# Patient Record
Sex: FEMALE | Race: OTHER | NOT HISPANIC OR LATINO | URBAN - METROPOLITAN AREA
[De-identification: names, ages, dates, MRNs, and addresses within clinical notes are randomized per-mention and may not be internally consistent; named-entity substitution may affect disease eponyms.]

---

## 2020-10-29 PROBLEM — Z00.00 ENCOUNTER FOR PREVENTIVE HEALTH EXAMINATION: Status: ACTIVE | Noted: 2020-10-29

## 2020-11-14 ENCOUNTER — OUTPATIENT (OUTPATIENT)
Dept: OUTPATIENT SERVICES | Facility: HOSPITAL | Age: 60
LOS: 1 days | End: 2020-11-14
Payer: COMMERCIAL

## 2020-11-14 ENCOUNTER — APPOINTMENT (OUTPATIENT)
Dept: ULTRASOUND IMAGING | Facility: IMAGING CENTER | Age: 60
End: 2020-11-14
Payer: COMMERCIAL

## 2020-11-14 DIAGNOSIS — K29.70 GASTRITIS, UNSPECIFIED, WITHOUT BLEEDING: ICD-10-CM

## 2020-11-14 PROCEDURE — 76700 US EXAM ABDOM COMPLETE: CPT | Mod: 26

## 2020-11-14 PROCEDURE — 76700 US EXAM ABDOM COMPLETE: CPT

## 2022-04-29 ENCOUNTER — EMERGENCY (EMERGENCY)
Facility: HOSPITAL | Age: 62
LOS: 1 days | Discharge: HOME | End: 2022-04-29
Attending: EMERGENCY MEDICINE | Admitting: EMERGENCY MEDICINE
Payer: COMMERCIAL

## 2022-04-29 VITALS
DIASTOLIC BLOOD PRESSURE: 86 MMHG | RESPIRATION RATE: 16 BRPM | OXYGEN SATURATION: 100 % | TEMPERATURE: 98 F | SYSTOLIC BLOOD PRESSURE: 151 MMHG | HEART RATE: 73 BPM

## 2022-04-29 PROCEDURE — 99285 EMERGENCY DEPT VISIT HI MDM: CPT

## 2022-04-29 NOTE — ED ADULT TRIAGE NOTE - CHIEF COMPLAINT QUOTE
Pt c/o mid and lower back pain radiating to ribs since friday. pt denies any urinary symptoms. pt  has an sonogram done and they didn't find any stones. No complaints of chest pain, headache, nausea, dizziness, vomiting  SOB, fever, chills verbalized.

## 2022-04-30 VITALS
SYSTOLIC BLOOD PRESSURE: 141 MMHG | RESPIRATION RATE: 18 BRPM | DIASTOLIC BLOOD PRESSURE: 78 MMHG | TEMPERATURE: 98 F | HEART RATE: 68 BPM | OXYGEN SATURATION: 100 %

## 2022-04-30 LAB
ALBUMIN SERPL ELPH-MCNC: 4.6 G/DL — SIGNIFICANT CHANGE UP (ref 3.3–5)
ALP SERPL-CCNC: 105 U/L — SIGNIFICANT CHANGE UP (ref 40–120)
ALT FLD-CCNC: 31 U/L — SIGNIFICANT CHANGE UP (ref 4–33)
ANION GAP SERPL CALC-SCNC: 15 MMOL/L — HIGH (ref 7–14)
ANISOCYTOSIS BLD QL: SLIGHT — SIGNIFICANT CHANGE UP
APPEARANCE UR: CLEAR — SIGNIFICANT CHANGE UP
AST SERPL-CCNC: 27 U/L — SIGNIFICANT CHANGE UP (ref 4–32)
BACTERIA # UR AUTO: NEGATIVE — SIGNIFICANT CHANGE UP
BASE EXCESS BLDV CALC-SCNC: 1.6 MMOL/L — SIGNIFICANT CHANGE UP (ref -2–3)
BASOPHILS # BLD AUTO: 0 K/UL — SIGNIFICANT CHANGE UP (ref 0–0.2)
BASOPHILS NFR BLD AUTO: 0 % — SIGNIFICANT CHANGE UP (ref 0–2)
BILIRUB SERPL-MCNC: 0.4 MG/DL — SIGNIFICANT CHANGE UP (ref 0.2–1.2)
BILIRUB UR-MCNC: NEGATIVE — SIGNIFICANT CHANGE UP
BLOOD GAS VENOUS COMPREHENSIVE RESULT: SIGNIFICANT CHANGE UP
BUN SERPL-MCNC: 21 MG/DL — SIGNIFICANT CHANGE UP (ref 7–23)
CALCIUM SERPL-MCNC: 10 MG/DL — SIGNIFICANT CHANGE UP (ref 8.4–10.5)
CHLORIDE BLDV-SCNC: 104 MMOL/L — SIGNIFICANT CHANGE UP (ref 96–108)
CHLORIDE SERPL-SCNC: 100 MMOL/L — SIGNIFICANT CHANGE UP (ref 98–107)
CO2 BLDV-SCNC: 30.2 MMOL/L — HIGH (ref 22–26)
CO2 SERPL-SCNC: 23 MMOL/L — SIGNIFICANT CHANGE UP (ref 22–31)
COLOR SPEC: COLORLESS — SIGNIFICANT CHANGE UP
CREAT SERPL-MCNC: 1.14 MG/DL — SIGNIFICANT CHANGE UP (ref 0.5–1.3)
DIFF PNL FLD: ABNORMAL
EGFR: 54 ML/MIN/1.73M2 — LOW
EOSINOPHIL # BLD AUTO: 0 K/UL — SIGNIFICANT CHANGE UP (ref 0–0.5)
EOSINOPHIL NFR BLD AUTO: 0 % — SIGNIFICANT CHANGE UP (ref 0–6)
EPI CELLS # UR: 0 /HPF — SIGNIFICANT CHANGE UP (ref 0–5)
GAS PNL BLDV: 138 MMOL/L — SIGNIFICANT CHANGE UP (ref 136–145)
GIANT PLATELETS BLD QL SMEAR: PRESENT — SIGNIFICANT CHANGE UP
GLUCOSE BLDV-MCNC: 167 MG/DL — HIGH (ref 70–99)
GLUCOSE SERPL-MCNC: 158 MG/DL — HIGH (ref 70–99)
GLUCOSE UR QL: NEGATIVE — SIGNIFICANT CHANGE UP
HCO3 BLDV-SCNC: 28 MMOL/L — SIGNIFICANT CHANGE UP (ref 22–29)
HCT VFR BLD CALC: 38.1 % — SIGNIFICANT CHANGE UP (ref 34.5–45)
HCT VFR BLDA CALC: 38 % — SIGNIFICANT CHANGE UP (ref 34.5–46.5)
HGB BLD CALC-MCNC: 12.5 G/DL — SIGNIFICANT CHANGE UP (ref 11.5–15.5)
HGB BLD-MCNC: 12.3 G/DL — SIGNIFICANT CHANGE UP (ref 11.5–15.5)
IANC: 10.95 K/UL — HIGH (ref 1.8–7.4)
KETONES UR-MCNC: NEGATIVE — SIGNIFICANT CHANGE UP
LACTATE BLDV-MCNC: 2.4 MMOL/L — HIGH (ref 0.5–2)
LEUKOCYTE ESTERASE UR-ACNC: NEGATIVE — SIGNIFICANT CHANGE UP
LIDOCAIN IGE QN: 33 U/L — SIGNIFICANT CHANGE UP (ref 7–60)
LYMPHOCYTES # BLD AUTO: 1.68 K/UL — SIGNIFICANT CHANGE UP (ref 1–3.3)
LYMPHOCYTES # BLD AUTO: 11.4 % — LOW (ref 13–44)
MCHC RBC-ENTMCNC: 24 PG — LOW (ref 27–34)
MCHC RBC-ENTMCNC: 32.3 GM/DL — SIGNIFICANT CHANGE UP (ref 32–36)
MCV RBC AUTO: 74.3 FL — LOW (ref 80–100)
MICROCYTES BLD QL: SLIGHT — SIGNIFICANT CHANGE UP
MONOCYTES # BLD AUTO: 0.38 K/UL — SIGNIFICANT CHANGE UP (ref 0–0.9)
MONOCYTES NFR BLD AUTO: 2.6 % — SIGNIFICANT CHANGE UP (ref 2–14)
NEUTROPHILS # BLD AUTO: 12.29 K/UL — HIGH (ref 1.8–7.4)
NEUTROPHILS NFR BLD AUTO: 83.4 % — HIGH (ref 43–77)
NITRITE UR-MCNC: NEGATIVE — SIGNIFICANT CHANGE UP
PCO2 BLDV: 54 MMHG — HIGH (ref 39–42)
PH BLDV: 7.33 — SIGNIFICANT CHANGE UP (ref 7.32–7.43)
PH UR: 7 — SIGNIFICANT CHANGE UP (ref 5–8)
PLAT MORPH BLD: NORMAL — SIGNIFICANT CHANGE UP
PLATELET # BLD AUTO: 254 K/UL — SIGNIFICANT CHANGE UP (ref 150–400)
PLATELET COUNT - ESTIMATE: NORMAL — SIGNIFICANT CHANGE UP
PO2 BLDV: 31 MMHG — SIGNIFICANT CHANGE UP
POTASSIUM BLDV-SCNC: 4 MMOL/L — SIGNIFICANT CHANGE UP (ref 3.5–5.1)
POTASSIUM SERPL-MCNC: 4.2 MMOL/L — SIGNIFICANT CHANGE UP (ref 3.5–5.3)
POTASSIUM SERPL-SCNC: 4.2 MMOL/L — SIGNIFICANT CHANGE UP (ref 3.5–5.3)
PROT SERPL-MCNC: 8.1 G/DL — SIGNIFICANT CHANGE UP (ref 6–8.3)
PROT UR-MCNC: NEGATIVE — SIGNIFICANT CHANGE UP
RBC # BLD: 5.13 M/UL — SIGNIFICANT CHANGE UP (ref 3.8–5.2)
RBC # FLD: 13.5 % — SIGNIFICANT CHANGE UP (ref 10.3–14.5)
RBC BLD AUTO: ABNORMAL
RBC CASTS # UR COMP ASSIST: 23 /HPF — HIGH (ref 0–4)
SAO2 % BLDV: 45.1 % — SIGNIFICANT CHANGE UP
SARS-COV-2 RNA SPEC QL NAA+PROBE: SIGNIFICANT CHANGE UP
SODIUM SERPL-SCNC: 138 MMOL/L — SIGNIFICANT CHANGE UP (ref 135–145)
SP GR SPEC: 1.01 — SIGNIFICANT CHANGE UP (ref 1–1.05)
TROPONIN T, HIGH SENSITIVITY RESULT: 6 NG/L — SIGNIFICANT CHANGE UP
UROBILINOGEN FLD QL: SIGNIFICANT CHANGE UP
VARIANT LYMPHS # BLD: 2.6 % — SIGNIFICANT CHANGE UP (ref 0–6)
WBC # BLD: 14.74 K/UL — HIGH (ref 3.8–10.5)
WBC # FLD AUTO: 14.74 K/UL — HIGH (ref 3.8–10.5)
WBC UR QL: 0 /HPF — SIGNIFICANT CHANGE UP (ref 0–5)

## 2022-04-30 PROCEDURE — 72131 CT LUMBAR SPINE W/O DYE: CPT | Mod: 26,MA

## 2022-04-30 PROCEDURE — G1004: CPT

## 2022-04-30 PROCEDURE — 72128 CT CHEST SPINE W/O DYE: CPT | Mod: 26,MA

## 2022-04-30 PROCEDURE — 74176 CT ABD & PELVIS W/O CONTRAST: CPT | Mod: 26,MG

## 2022-04-30 PROCEDURE — 71045 X-RAY EXAM CHEST 1 VIEW: CPT | Mod: 26

## 2022-04-30 RX ORDER — ACETAMINOPHEN 500 MG
975 TABLET ORAL ONCE
Refills: 0 | Status: COMPLETED | OUTPATIENT
Start: 2022-04-30 | End: 2022-04-30

## 2022-04-30 RX ORDER — SODIUM CHLORIDE 9 MG/ML
1000 INJECTION INTRAMUSCULAR; INTRAVENOUS; SUBCUTANEOUS ONCE
Refills: 0 | Status: COMPLETED | OUTPATIENT
Start: 2022-04-30 | End: 2022-04-30

## 2022-04-30 RX ADMIN — SODIUM CHLORIDE 1000 MILLILITER(S): 9 INJECTION INTRAMUSCULAR; INTRAVENOUS; SUBCUTANEOUS at 01:23

## 2022-04-30 RX ADMIN — Medication 975 MILLIGRAM(S): at 01:23

## 2022-04-30 NOTE — ED ADULT NURSE NOTE - OBJECTIVE STATEMENT
Received in room 17 with c/o right side lower back pain radiating to ribs for the last 2 days. Denies any urinary symptoms. Also c/o nausea. not in acute distress. Alert and oriented x 4, safety maintained. 20G IV line inserted in right AC. Due labs sent. Medication given as per order.

## 2022-04-30 NOTE — ED PROVIDER NOTE - PROGRESS NOTE DETAILS
Johnnie GOVEA: Pt is stable and ready for discharge. Strict return precautions given. All questions answered.

## 2022-04-30 NOTE — ED PROVIDER NOTE - PHYSICAL EXAMINATION
GENERAL: well appearing in no acute distress, non-toxic appearing  HEAD: normocephalic, atraumatic  HEENT: normal conjunctiva, oral mucosa moist, uvula midline, no tonsilar exudates, neck supple, no JVD  CARDIAC: regular rate and rhythm, normal S1S2, no appreciable murmurs, 2+ pulses in UE  PULM: normal breath sounds, clear to ascultation bilaterally, no rales, rhonchi, wheezing  GI: abdomen nondistended, soft, nontender, no guarding, rebound tenderness  : no CVA tenderness b/l, no suprapubic tenderness  NEURO: no focal motor or sensory deficits, CN2-12 intact, normal speech, PERRLA, EOMI, normal gait, AAOx3  MSK: no peripheral edema, no calf tenderness b/l  SKIN: extremities warm, no visible rashes  PSYCH: appropriate mood and affect

## 2022-04-30 NOTE — ED PROVIDER NOTE - NS ED ROS FT
CONSTITUTIONAL: No fever, weight loss, or fatigue  EYES: No eye pain, visual disturbances, or discharge  ENMT:  No difficulty hearing, tinnitus, vertigo; No sinus or throat pain  NECK: No pain or stiffness  RESPIRATORY: No cough, wheezing, chills or hemoptysis; No shortness of breath  CARDIOVASCULAR: No chest pain, palpitations, dizziness, or leg swelling  GASTROINTESTINAL: + abdominal or epigastric pain. +nausea, no vomiting, or hematemesis; No diarrhea or constipation. No melena or hematochezia.  GENITOURINARY: No dysuria, frequency, +hematuria, no incontinence  NEUROLOGICAL: No headaches, memory loss, loss of strength, numbness, or tremors  SKIN: No itching, burning, rashes, or lesions   MSK:+ Back pain

## 2022-04-30 NOTE — ED PROVIDER NOTE - CLINICAL SUMMARY MEDICAL DECISION MAKING FREE TEXT BOX
60 y/o female w/ PMH DM, HTN, breast cancer s/p left breast lumpectomy c/o 1 week history of intermittent moderate localized mid-low back pain, relieved w/ Aleve. Pt had hematuria at PCP's office, initially thought of kidney stones but unremarkable abd/pelvis sonogram. PCP sent for CT scan. Pt is now having right flank pain w/ radiation to the right abdomen in addition to her mid-low back pain. Pt took 600mg ibuprofen at 7pm prior to ED arrival. Admits to nausea. Likely nephrolithiasis in the setting of known hematuria. Benign abdominal exam w/o CVA tenderness and no midline or paraspinal tenderness in the back. Will obtain CT imaging of back, abd, and pelvis to screen for tumor (hx of breast cancer), kidney stones, and reassess w/ likely d/c w/ close uro f/u.

## 2022-04-30 NOTE — ED PROVIDER NOTE - ATTENDING CONTRIBUTION TO CARE
60yo F with PMHx DM, HTN, renal stones without prior interventions required, L breast ca s/p lumpectomy now in remission, p/w atraumatic R flank/back pain with radiation to abdomen with nausea but no vomiting, no dysuria or hematuria. Pt notes she saw her PCP and had a UA that showed microscopic hematuria and had a renal ultrasound with no abnormalities. Pt comes ot ED for CT scan.  No fevers    General: Patient alert in no apparent distress  Skin: Dry and intact  HEENT: Head atraumatic. Oral mucosa moist.   Eyes: Conjunctiva normal  Cardiac: Regular rhythm and rate. No pretibial edema b/l  Respiratory: Lungs clear b/l and symmetric. No respiratory distress. Able to speak in complete sentences.  Gastrointestinal: Abdomen soft, nondistended, nontender  Musculoskeletal: Moves all extremities spontaneously  Neurological: alert and oriented to person, place, and time  Psychiatric: Calm and cooperative    a/p  likely ureterolithiasis vs malignancy or uti  tylenol, ua, labs, CT noncon abd/pelvis, screening CT T/L spine for fx given age

## 2022-04-30 NOTE — ED PROVIDER NOTE - NSFOLLOWUPINSTRUCTIONS_ED_ALL_ED_FT
Kidney Stones    Kidney stones (urolithiasis) are crystal deposits that form inside your kidneys. Pain is caused by the stone moving through the urinary tract, causing spasms of the ureter. Drink enough water and fluids to keep your urine clear or pale yellow. This will help you to pass the stone or stone fragments. If provided a strainer, strain all urine and keep all particulate matter and stones for a follow up appointment with a urologist.    SEEK IMMEDIATE MEDICAL CARE IF YOU HAVE ANY OF THE FOLLOWING SYMPTOMS: pain not controlled with medication, fever/chills, worsening vomiting, inability to urinate, or dizziness/lightheadedness.    Back Pain    Back pain is very common in adults. The cause of back pain is rarely dangerous and the pain often gets better over time. The cause of your back pain may not be known and may include strain of muscles or ligaments, degeneration of the spinal disks, or arthritis. Occasionally the pain may radiate down your leg(s). Over-the-counter medicines to reduce pain and inflammation are often the most helpful. Stretching and remaining active frequently helps the healing process.     SEEK IMMEDIATE MEDICAL CARE IF YOU HAVE ANY OF THE FOLLOWING SYMPTOMS: bowel or bladder control problems, unusual weakness or numbness in your arms or legs, nausea or vomiting, abdominal pain, fever, dizziness/lightheadedness. Please follow up with your urology doctor within this week.     Kidney Stones    Kidney stones (urolithiasis) are crystal deposits that form inside your kidneys. Pain is caused by the stone moving through the urinary tract, causing spasms of the ureter. Drink enough water and fluids to keep your urine clear or pale yellow. This will help you to pass the stone or stone fragments. If provided a strainer, strain all urine and keep all particulate matter and stones for a follow up appointment with a urologist.    SEEK IMMEDIATE MEDICAL CARE IF YOU HAVE ANY OF THE FOLLOWING SYMPTOMS: pain not controlled with medication, fever/chills, worsening vomiting, inability to urinate, or dizziness/lightheadedness.    Back Pain    Back pain is very common in adults. The cause of back pain is rarely dangerous and the pain often gets better over time. The cause of your back pain may not be known and may include strain of muscles or ligaments, degeneration of the spinal disks, or arthritis. Occasionally the pain may radiate down your leg(s). Over-the-counter medicines to reduce pain and inflammation are often the most helpful. Stretching and remaining active frequently helps the healing process.     SEEK IMMEDIATE MEDICAL CARE IF YOU HAVE ANY OF THE FOLLOWING SYMPTOMS: bowel or bladder control problems, unusual weakness or numbness in your arms or legs, nausea or vomiting, abdominal pain, fever, dizziness/lightheadedness.

## 2022-04-30 NOTE — ED PROVIDER NOTE - NSFOLLOWUPCLINICS_GEN_ALL_ED_FT
[facility] - Urology  Urology  [address], [address] & [address] [place] [address]  Phone: [phone]  Fax:

## 2022-04-30 NOTE — ED PROVIDER NOTE - OBJECTIVE STATEMENT
62 y/o female w/ PMH DM, HTN, breast cancer s/p left breast lumpectomy c/o 1 week history of intermittent moderate localized mid-low back pain, relieved w/ Aleve. Pt had hematuria at PCP's office, initially thought of kidney stones but unremarkable abd/pelvis sonogram. PCP sent for CT scan. Pt is now having right flank pain w/ radiation to the right abdomen in addition to her mid-low back pain. Pt took 600mg ibuprofen at 7pm prior to ED arrival. Admits to nausea. Denies fevers, chills, vomiting, dizziness, chest pain, SOB, dysuria, diarrhea, constipation.

## 2022-04-30 NOTE — ED PROVIDER NOTE - PATIENT PORTAL LINK FT
You can access the FollowMyHealth Patient Portal offered by [facility] by registering at the following website: [email or web address]. By joining [facility]’s FollowMyHealth portal, you will also be able to view your health information using other applications (apps) compatible with our system.

## 2022-05-01 LAB
CULTURE RESULTS: SIGNIFICANT CHANGE UP
SPECIMEN SOURCE: SIGNIFICANT CHANGE UP

## 2022-05-02 ENCOUNTER — APPOINTMENT (OUTPATIENT)
Dept: UROLOGY | Facility: CLINIC | Age: 62
End: 2022-05-02
Payer: COMMERCIAL

## 2022-05-02 VITALS
HEIGHT: 62 IN | TEMPERATURE: 97.2 F | DIASTOLIC BLOOD PRESSURE: 84 MMHG | HEART RATE: 65 BPM | SYSTOLIC BLOOD PRESSURE: 146 MMHG | OXYGEN SATURATION: 98 % | WEIGHT: 116 LBS | BODY MASS INDEX: 21.35 KG/M2 | RESPIRATION RATE: 12 BRPM

## 2022-05-02 PROCEDURE — 99204 OFFICE O/P NEW MOD 45 MIN: CPT

## 2022-05-02 NOTE — ASSESSMENT
[FreeTextEntry1] : Very pleasant 61-year-old woman who presents for evaluation of right ureteral stone, right flank pain\par -CT images reviewed with patient demonstrating approximately 5 to 6 mm right distal ureteral stone\par -We discussed options for management of a distal ureteral stone, including observation with trial of a spontaneous stone passage, ESWL, PCNL, ureteroscopy with laser lithotripsy and after thorough discussion of the risks benefits and alternatives of the procedure she elected to undergo a ureteroscopy\par -I discussed the different treatment modalities for nephrolithiasis with the patient, including medical management, spontaneous stone passage, percutaneous stone extraction, extracorporeal shock wave lithotripsy, and ureteroscopy with laser lithotripsy and stone extraction. Given the size and location of the stone, the patient opted to proceed with ureteroscopy.  The risks, benefits, and alternatives to ureteroscopy were discussed with the patient, including but not limited to pain, infection, bleeding, bladder injury, ureteral injury, renal injury, and treatment failure.  I also discussed the possible need for a temporary ureteral stent.  I discussed the possible side effects of a temporary ureteral stent, including flank pain, hematuria, and bladder spasms.  The patient understands that a stent is a temporary implant that must be removed in the future.  The patient wishes to proceed and we will schedule the surgery for the near future.\par -Urine culture

## 2022-05-02 NOTE — HISTORY OF PRESENT ILLNESS
[FreeTextEntry1] : MildVery pleasant 61-year-old woman who presents for evaluation of right ureteral stone and right flank pain.  She recently went to the emergency department where a CT scan was performed which demonstrated a 4.6 mm distal right ureteral stone and right flank pain.  She reports flank pain for the last few months.  She reports kidney stones on the right in the past for which she underwent ESWL.  She reports pain.  She is very interested in pursuing surgical options to address the ureteral stone.

## 2022-05-04 LAB — BACTERIA UR CULT: NORMAL

## 2022-05-05 ENCOUNTER — NON-APPOINTMENT (OUTPATIENT)
Age: 62
End: 2022-05-05

## 2022-05-18 ENCOUNTER — OUTPATIENT (OUTPATIENT)
Dept: OUTPATIENT SERVICES | Facility: HOSPITAL | Age: 62
LOS: 1 days | End: 2022-05-18
Payer: COMMERCIAL

## 2022-05-18 ENCOUNTER — TRANSCRIPTION ENCOUNTER (OUTPATIENT)
Age: 62
End: 2022-05-18

## 2022-05-18 VITALS
WEIGHT: 160.94 LBS | HEIGHT: 62 IN | RESPIRATION RATE: 16 BRPM | HEART RATE: 66 BPM | SYSTOLIC BLOOD PRESSURE: 146 MMHG | DIASTOLIC BLOOD PRESSURE: 92 MMHG | TEMPERATURE: 98 F

## 2022-05-18 DIAGNOSIS — E11.9 TYPE 2 DIABETES MELLITUS WITHOUT COMPLICATIONS: ICD-10-CM

## 2022-05-18 DIAGNOSIS — Z01.818 ENCOUNTER FOR OTHER PREPROCEDURAL EXAMINATION: ICD-10-CM

## 2022-05-18 DIAGNOSIS — N20.1 CALCULUS OF URETER: ICD-10-CM

## 2022-05-18 DIAGNOSIS — I10 ESSENTIAL (PRIMARY) HYPERTENSION: ICD-10-CM

## 2022-05-18 DIAGNOSIS — Z98.890 OTHER SPECIFIED POSTPROCEDURAL STATES: Chronic | ICD-10-CM

## 2022-05-18 PROCEDURE — G0463: CPT

## 2022-05-18 NOTE — H&P PST ADULT - NSICDXFAMILYHX_GEN_ALL_CORE_FT
FAMILY HISTORY:  Father  Still living? Unknown  Family history of kidney disease, Age at diagnosis: Age Unknown    Mother  Still living? Unknown  Family history of heart disease, Age at diagnosis: Age Unknown

## 2022-05-18 NOTE — H&P PST ADULT - NSICDXPASTMEDICALHX_GEN_ALL_CORE_FT
PAST MEDICAL HISTORY:  Cancer of breast     DM (diabetes mellitus)     Hyperlipidemia     Hypertension

## 2022-05-18 NOTE — H&P PST ADULT - PROBLEM SELECTOR PLAN 2
Current treatment regimen - Amlodipine 5mg and Atenolol 50mg daily. Advised to continue with current regimen.   Patient instructed with understanding verbalized to take Amlodipine and Atenolol with a sip of water the day of surgery.   Preoperative cardiac optimization scheduled for today at 12pm   Follow up with PCP/Cardiologist  postoperatively

## 2022-05-18 NOTE — H&P PST ADULT - PROBLEM SELECTOR PLAN 1
Patient is scheduled for Cystoscopy right ureteroscopy, laser lithotripsy stone extraction, stent placement and retrograde pyelogram on 5/19/2022  Written and oral preoperative instructions given to patient with understanding verbalized.   Instructions given to include using 4% chlorhexidine wash as directed starting 3days before day of surgery (inclusive of day of surgery)  Maintaining NPO status post midnight day before surgery  Stopping aspirin, NSAIDs, herbs, vitamins 7days before surgery   Patient is to expect a phone call day before surgery between the hours of 430- 630pm giving arrival time for surgery day.    Patient today with STOP bang score of 2, Low risk for YAMILET   Preoperative medical optimization done by PCP. Report to be obtained

## 2022-05-18 NOTE — H&P PST ADULT - REASON FOR ADMISSION
Cystoscopy right ureteroscopy, laser lithotripsy stone extraction, stent placement and retrograde pyelogram

## 2022-05-18 NOTE — H&P PST ADULT - ACTIVITY
Daily walks, ADLs, house chores, able to walk 3-4flights of stairs w/o exertional chest pain or sob with leg pain

## 2022-05-18 NOTE — H&P PST ADULT - HISTORY OF PRESENT ILLNESS
61year old female with pmhx of hypertension, hyperlipidemia, left breast CA in 2005 - s/p lumpectomy with chemo/radiation therapy and newly diagnosed T2DM presents with c/p calculus of ureter. Patient is here today for presurgical testing for scheduled cystoscopy right ureteroscopy, laser lithotripsy, stone extraction, stent placement and retrograde pyelogram on 5/19/2022

## 2022-05-18 NOTE — H&P PST ADULT - PROBLEM SELECTOR PLAN 3
Current treatment regimen for diabetes - Metformin 500mg daily. Advised to c/w regimen  Patient instructed with understanding verbalized to HOLD Metformin the day of surgery.  Last Hgb A1C done at PCP's office 7.5 on 5/16/2022  Fingerstick STAT AM day of surgery ordered  Follow up with PCP postoperatively.

## 2022-05-18 NOTE — H&P PST ADULT - NSANTHOSAYNRD_GEN_A_CORE
No. YAMILET screening performed.  STOP BANG Legend: 0-2 = LOW Risk; 3-4 = INTERMEDIATE Risk; 5-8 = HIGH Risk

## 2022-05-19 ENCOUNTER — RESULT REVIEW (OUTPATIENT)
Age: 62
End: 2022-05-19

## 2022-05-19 ENCOUNTER — APPOINTMENT (OUTPATIENT)
Dept: UROLOGY | Facility: HOSPITAL | Age: 62
End: 2022-05-19

## 2022-05-19 ENCOUNTER — TRANSCRIPTION ENCOUNTER (OUTPATIENT)
Age: 62
End: 2022-05-19

## 2022-05-19 ENCOUNTER — OUTPATIENT (OUTPATIENT)
Dept: OUTPATIENT SERVICES | Facility: HOSPITAL | Age: 62
LOS: 1 days | End: 2022-05-19
Payer: COMMERCIAL

## 2022-05-19 VITALS
HEART RATE: 62 BPM | WEIGHT: 160.94 LBS | HEIGHT: 62 IN | SYSTOLIC BLOOD PRESSURE: 153 MMHG | RESPIRATION RATE: 16 BRPM | TEMPERATURE: 99 F | DIASTOLIC BLOOD PRESSURE: 81 MMHG | OXYGEN SATURATION: 100 %

## 2022-05-19 VITALS
DIASTOLIC BLOOD PRESSURE: 65 MMHG | TEMPERATURE: 98 F | HEART RATE: 68 BPM | SYSTOLIC BLOOD PRESSURE: 118 MMHG | RESPIRATION RATE: 16 BRPM | OXYGEN SATURATION: 99 %

## 2022-05-19 DIAGNOSIS — N20.1 CALCULUS OF URETER: ICD-10-CM

## 2022-05-19 DIAGNOSIS — Z98.890 OTHER SPECIFIED POSTPROCEDURAL STATES: Chronic | ICD-10-CM

## 2022-05-19 LAB
GLUCOSE BLDC GLUCOMTR-MCNC: 105 MG/DL — HIGH (ref 70–99)
GLUCOSE BLDC GLUCOMTR-MCNC: 113 MG/DL — HIGH (ref 70–99)

## 2022-05-19 PROCEDURE — C2617: CPT

## 2022-05-19 PROCEDURE — 82365 CALCULUS SPECTROSCOPY: CPT

## 2022-05-19 PROCEDURE — 76000 FLUOROSCOPY <1 HR PHYS/QHP: CPT

## 2022-05-19 PROCEDURE — 88300 SURGICAL PATH GROSS: CPT | Mod: 26

## 2022-05-19 PROCEDURE — 52356 CYSTO/URETERO W/LITHOTRIPSY: CPT | Mod: RT

## 2022-05-19 PROCEDURE — C1889: CPT

## 2022-05-19 PROCEDURE — 88300 SURGICAL PATH GROSS: CPT

## 2022-05-19 PROCEDURE — C1769: CPT

## 2022-05-19 PROCEDURE — 82962 GLUCOSE BLOOD TEST: CPT

## 2022-05-19 DEVICE — BASKET ZEROTIP NITINOL 1.9FR 120CM X 12MM 4 WIRES: Type: IMPLANTABLE DEVICE | Status: FUNCTIONAL

## 2022-05-19 DEVICE — GUIDEWIRE SENSOR ANG 150CM .038: Type: IMPLANTABLE DEVICE | Status: FUNCTIONAL

## 2022-05-19 DEVICE — LASER FIBER FLEXIVA 242 HI POWER: Type: IMPLANTABLE DEVICE | Status: FUNCTIONAL

## 2022-05-19 DEVICE — GWIRE SENS NIT 0.035INX150CM: Type: IMPLANTABLE DEVICE | Status: FUNCTIONAL

## 2022-05-19 DEVICE — CATH URET M/F SH 14FR: Type: IMPLANTABLE DEVICE | Status: FUNCTIONAL

## 2022-05-19 DEVICE — LASER FIBER FLEXIVA 550: Type: IMPLANTABLE DEVICE | Status: FUNCTIONAL

## 2022-05-19 DEVICE — CATH URET CONE TIP 8FR WVN: Type: IMPLANTABLE DEVICE | Status: FUNCTIONAL

## 2022-05-19 DEVICE — CATH URET 5FR 70CM: Type: IMPLANTABLE DEVICE | Status: FUNCTIONAL

## 2022-05-19 DEVICE — BASKET GEMINI HELICAL 3FR 120CM X 11MM 4 WIRES  OD X Â??0Â?? TIP: Type: IMPLANTABLE DEVICE | Status: FUNCTIONAL

## 2022-05-19 DEVICE — GUIDEWIRE .038IN X 3CM ANGLE X 150CM: Type: IMPLANTABLE DEVICE | Status: FUNCTIONAL

## 2022-05-19 DEVICE — BASKET 3FR NCIRCLE TIPLESS STONE EXTRACTOR: Type: IMPLANTABLE DEVICE | Status: FUNCTIONAL

## 2022-05-19 DEVICE — GWIRE SENSOR DUAL-FLEX NITINOL0.038INX150CM: Type: IMPLANTABLE DEVICE | Status: FUNCTIONAL

## 2022-05-19 DEVICE — LASER FIBER FLEXIVA 365: Type: IMPLANTABLE DEVICE | Status: FUNCTIONAL

## 2022-05-19 DEVICE — CATH URET 2LUM 10FR 50CM: Type: IMPLANTABLE DEVICE | Status: FUNCTIONAL

## 2022-05-19 DEVICE — GUIDEWIRE AMPLATZ EXTRA STIFF: Type: IMPLANTABLE DEVICE | Status: FUNCTIONAL

## 2022-05-19 DEVICE — KIT URET PERFLX PLUS 6FRX22CM: Type: IMPLANTABLE DEVICE | Status: FUNCTIONAL

## 2022-05-19 RX ORDER — FENTANYL CITRATE 50 UG/ML
50 INJECTION INTRAVENOUS
Refills: 0 | Status: DISCONTINUED | OUTPATIENT
Start: 2022-05-19 | End: 2022-05-20

## 2022-05-19 RX ORDER — FENTANYL CITRATE 50 UG/ML
25 INJECTION INTRAVENOUS
Refills: 0 | Status: DISCONTINUED | OUTPATIENT
Start: 2022-05-19 | End: 2022-05-20

## 2022-05-19 RX ORDER — SODIUM CHLORIDE 9 MG/ML
3 INJECTION INTRAMUSCULAR; INTRAVENOUS; SUBCUTANEOUS EVERY 8 HOURS
Refills: 0 | Status: DISCONTINUED | OUTPATIENT
Start: 2022-05-19 | End: 2022-05-19

## 2022-05-19 RX ORDER — SODIUM CHLORIDE 9 MG/ML
1000 INJECTION, SOLUTION INTRAVENOUS
Refills: 0 | Status: DISCONTINUED | OUTPATIENT
Start: 2022-05-19 | End: 2022-05-20

## 2022-05-19 RX ORDER — METFORMIN HYDROCHLORIDE 850 MG/1
1 TABLET ORAL
Qty: 0 | Refills: 0 | DISCHARGE

## 2022-05-19 RX ORDER — ONDANSETRON 8 MG/1
4 TABLET, FILM COATED ORAL ONCE
Refills: 0 | Status: DISCONTINUED | OUTPATIENT
Start: 2022-05-19 | End: 2022-05-20

## 2022-05-19 RX ORDER — AMLODIPINE BESYLATE 2.5 MG/1
1 TABLET ORAL
Qty: 0 | Refills: 0 | DISCHARGE

## 2022-05-19 RX ORDER — SODIUM CHLORIDE 9 MG/ML
1000 INJECTION, SOLUTION INTRAVENOUS
Refills: 0 | Status: DISCONTINUED | OUTPATIENT
Start: 2022-05-19 | End: 2022-06-02

## 2022-05-19 RX ORDER — ATENOLOL 25 MG/1
1 TABLET ORAL
Qty: 0 | Refills: 0 | DISCHARGE

## 2022-05-19 RX ORDER — CEPHALEXIN 500 MG
1 CAPSULE ORAL
Qty: 6 | Refills: 0
Start: 2022-05-19 | End: 2022-05-21

## 2022-05-19 RX ORDER — ACETAMINOPHEN 500 MG
1000 TABLET ORAL ONCE
Refills: 0 | Status: DISCONTINUED | OUTPATIENT
Start: 2022-05-19 | End: 2022-05-20

## 2022-05-19 RX ADMIN — SODIUM CHLORIDE 3 MILLILITER(S): 9 INJECTION INTRAMUSCULAR; INTRAVENOUS; SUBCUTANEOUS at 11:55

## 2022-05-19 NOTE — ASU PATIENT PROFILE, ADULT - FALL HARM RISK - UNIVERSAL INTERVENTIONS
Bed in lowest position, wheels locked, appropriate side rails in place/Call bell, personal items and telephone in reach/Instruct patient to call for assistance before getting out of bed or chair/Non-slip footwear when patient is out of bed/[place] to call system/Physically safe environment - no spills, clutter or unnecessary equipment/Purposeful Proactive Rounding/Room/bathroom lighting operational, light cord in reach

## 2022-05-19 NOTE — ASU DISCHARGE PLAN (ADULT/PEDIATRIC) - ASU DC SPECIAL INSTRUCTIONSFT
It is common to have blood in the urine after your procedure.  It may be pink or even red; inform your doctor if you have a significant amount of clots in the urine or if you are unable to void at all.  Be sure to drink plenty of fluids at all times.    You may have intermittent pink tinged urine and slight flank pain when you urinate.  This is normal and due to the stent in your ureter.   If your urine becomes bright red or with clots, please call the office. You should follow-up with Dr. Wang in the office for stent removal, ~5-7 days. Call the office to arrange this appointment.     -It is not uncommon to have some burning when you urinate or to even notice some stone fragments in your urine.  -You have an internal stent (a hollow tube that runs from the kidney to your bladder) after your procedure, helping your kidney drain down to your bladder after your surgery.  Some patients do not notice that they have a stent, while others complain of the sensation of needing to urinate frequently, burning on urination, or even some back pain (especially when they go to urinate). These sensations usually improve gradually, some faster than others. This is not uncommon, but may initially warrant the use of the pain medication which you were prescribed.  While the stent is in place, your urine may continue to be bloody. This stent is temporary and must be removed by your urologist as an outpatient.  -Provided that you are not restricted with fluids by your physician, you should drink 6-8 (8 oz.) glasses of fluid per day.  -You may resume your regular diet and regular medication regimen.    -You may shower or bathe.    -You may take over the counter pain medications such as Tylenol, do not exceed 4 grams daily.  If you have severe pain that does not improve with the pain medication or you have persistent vomiting, call your doctor.  -You will have a prescription for an antibiotic when you go home.  Take this medication as instructed; if you miss one dose, resume taking it on your previous schedule until you have completed the entire course of treatment.  -As you have just underwent general anesthesia, you should refrain from driving, heavy lifting, smoking, alcohol consumption, or important decision making for the next 24 hours.  You may climb stairs and you may resume sexual activity.  -Call your physician if you have a fever over 101F.  -Make a follow up appointment for with your urologist when you arrive home (or the next business day).  -Call your urologist during normal business hours with any other routine questions.

## 2022-05-19 NOTE — ASU DISCHARGE PLAN (ADULT/PEDIATRIC) - CARE PROVIDER_API CALL
Benson Wang (MD)  Urology  [address]  [place] [address]  Phone: [phone]  Fax: [phone]  Follow Up Time:

## 2022-05-19 NOTE — BRIEF OPERATIVE NOTE - NSICDXBRIEFPROCEDURE_GEN_ALL_CORE_FT
PROCEDURES:  Ureteroscopy with lithotripsy using holmium laser 19-May-2022 14:41:16  Mario Yañez  Cystoscopic insertion of ureteral stent 19-May-2022 14:41:26  Mario Yañez

## 2022-05-19 NOTE — ASU DISCHARGE PLAN (ADULT/PEDIATRIC) - NS MD DC FALL RISK RISK
For information on Fall & Injury Prevention, visit: [email or web address] OR  [email or web address] OR  https://www.cdc.gov/steadi/patient.html

## 2022-05-20 PROBLEM — I10 ESSENTIAL (PRIMARY) HYPERTENSION: Chronic | Status: ACTIVE | Noted: 2022-05-18

## 2022-05-20 PROBLEM — E11.9 TYPE 2 DIABETES MELLITUS WITHOUT COMPLICATIONS: Chronic | Status: ACTIVE | Noted: 2022-05-18

## 2022-05-20 PROBLEM — E78.5 HYPERLIPIDEMIA, UNSPECIFIED: Chronic | Status: ACTIVE | Noted: 2022-05-18

## 2022-05-20 PROBLEM — C50.919 MALIGNANT NEOPLASM OF UNSPECIFIED SITE OF UNSPECIFIED FEMALE BREAST: Chronic | Status: ACTIVE | Noted: 2022-05-18

## 2022-05-25 LAB — NIDUS STONE QN: SIGNIFICANT CHANGE UP

## 2022-05-31 LAB — SURGICAL PATHOLOGY STUDY: SIGNIFICANT CHANGE UP

## 2022-06-01 ENCOUNTER — APPOINTMENT (OUTPATIENT)
Dept: UROLOGY | Facility: CLINIC | Age: 62
End: 2022-06-01
Payer: COMMERCIAL

## 2022-06-01 VITALS
HEIGHT: 62 IN | HEART RATE: 89 BPM | BODY MASS INDEX: 21.35 KG/M2 | SYSTOLIC BLOOD PRESSURE: 148 MMHG | DIASTOLIC BLOOD PRESSURE: 88 MMHG | TEMPERATURE: 98 F | WEIGHT: 116 LBS

## 2022-06-01 PROCEDURE — 52315 CYSTOSCOPY AND TREATMENT: CPT

## 2022-07-18 ENCOUNTER — APPOINTMENT (OUTPATIENT)
Dept: UROLOGY | Facility: CLINIC | Age: 62
End: 2022-07-18

## 2022-07-18 VITALS
OXYGEN SATURATION: 99 % | DIASTOLIC BLOOD PRESSURE: 81 MMHG | BODY MASS INDEX: 21.53 KG/M2 | TEMPERATURE: 97.1 F | SYSTOLIC BLOOD PRESSURE: 144 MMHG | HEIGHT: 62 IN | RESPIRATION RATE: 12 BRPM | WEIGHT: 117 LBS | HEART RATE: 58 BPM

## 2022-07-18 DIAGNOSIS — N20.1 CALCULUS OF URETER: ICD-10-CM

## 2022-07-18 PROCEDURE — 99214 OFFICE O/P EST MOD 30 MIN: CPT

## 2022-07-18 NOTE — HISTORY OF PRESENT ILLNESS
[FreeTextEntry1] : Very pleasant 62-year-old woman who presents for follow-up of kidney stones.  She recently underwent a cystoscopy with right ureteroscopy and laser lithotripsy for a ureteral stone.  Calculus analysis demonstrated 1 high percent calcium oxalate stone composition.  She feels well after the procedure.  She denies dysuria.  No hematuria.  No flank pain or suprapubic pain.

## 2022-07-18 NOTE — ASSESSMENT
[FreeTextEntry1] : Very pleasant 62-year-old woman presents for follow-up of right ureteral stone status post ureteroscopy and stone extraction\par -Calculus analysis reviewed with patient demonstrating 100% calcium oxalate monohydrate stone composition\par -Low oxalate diet\par -Normal calcium diet\par -Stay adequately hydrated\par -Renal ultrasound in 3 months and follow-up at that time

## 2022-10-19 ENCOUNTER — APPOINTMENT (OUTPATIENT)
Dept: UROLOGY | Facility: CLINIC | Age: 62
End: 2022-10-19

## 2022-10-19 DIAGNOSIS — Z87.442 PERSONAL HISTORY OF URINARY CALCULI: ICD-10-CM

## 2022-10-19 PROCEDURE — 99213 OFFICE O/P EST LOW 20 MIN: CPT

## 2022-10-19 PROCEDURE — 76775 US EXAM ABDO BACK WALL LIM: CPT

## 2022-10-19 NOTE — HISTORY OF PRESENT ILLNESS
[FreeTextEntry1] : Very pleasant 62-year-old woman who presents for follow-up of kidney stones.  She recently underwent a cystoscopy with right ureteroscopy and laser lithotripsy for a ureteral stone.  Calculus analysis demonstrated 100% calcium oxalate stone composition.  She feels well after the procedure.  She denies dysuria.  No hematuria.  No flank pain or suprapubic pain.\par \par Renal US today demonstrates no kidney stones, hydronephrosis, or  renal masses.

## 2022-10-19 NOTE — ASSESSMENT
[FreeTextEntry1] : Very pleasant 62-year-old woman who presents for follow-up of kidney stones\par -Renal ultrasound images reviewed with the patient demonstrating no kidney stones, hydronephrosis, renal masses\par -We discussed general stone prevention strategies\par -We discussed option for work-up for kidney stones, however at this time she wishes to forego this\par -Follow-up in 6 months with repeat renal ultrasound for stone surveillance

## 2023-04-19 ENCOUNTER — APPOINTMENT (OUTPATIENT)
Dept: UROLOGY | Facility: CLINIC | Age: 63
End: 2023-04-19

## 2024-12-03 NOTE — ED ADULT NURSE NOTE - NSFALLRSKPASTHIST_ED_ALL_ED
Onset: 4-5 days     Location / description: Pt reporting currently has tinnitus in both ears. Patient had blurred vision in left eye 2 days ago.     Denies congestion but having a headache   2 days ago left eye, vision was obscured- has resoled in about 30 minutes and has not reoccurred    Precipitating Factors: received 2nd shingles vaccine last week     Pain Scale (1-10), 10 highest: 1/10 In head     What  improves / worsens symptoms: none    Symptom specific medications: ibuprofen     Recent visits (last 3-4 weeks) for same reason or recent surgery:  Has not been seen for above     PLAN:  Provider's office  See Provider within 24 hour    No appts available in PCP office, encouraged pt to be seen ED.    Encouraged to call back with any additional questions, concerns, or change in symptoms, patient/caller verbalized understanding.  Patient/Caller agrees to follow recommendations.  Reason for Disposition   MODERATE-SEVERE tinnitus (i.e., interferes with work, school, or sleep)   Headache and age > 50 years    Protocols used: Vision Loss or Change-A-OH, Tinnitus-A-OH     no

## 2025-06-16 ENCOUNTER — TRANSCRIPTION ENCOUNTER (OUTPATIENT)
Age: 65
End: 2025-06-16

## 2025-06-16 ENCOUNTER — RESULT REVIEW (OUTPATIENT)
Age: 65
End: 2025-06-16

## (undated) DEVICE — WRAP COMPRESSION CALF MED

## (undated) DEVICE — BLANKET WARMER UPPER ADULT

## (undated) DEVICE — FOLEY HOLDER STATLOCK 2 WAY ADULT

## (undated) DEVICE — POSITIONER HEAD REST PRONE

## (undated) DEVICE — GLV 7.5 PROTEXIS

## (undated) DEVICE — SOL IRR POUR NS 0.9% 500ML

## (undated) DEVICE — GLV 7 PROTEXIS

## (undated) DEVICE — TUBING RANGER FLUID IRRIGATION SET DISP

## (undated) DEVICE — TUBING TUR 2 PRONG

## (undated) DEVICE — SYR ASEPTO

## (undated) DEVICE — WRAP COMPRESSION CALF LG

## (undated) DEVICE — GLV 8 PROTEXIS

## (undated) DEVICE — BIOPSY PORT SELF SEALING DISP

## (undated) DEVICE — POSITIONER FOAM EGG CRATE ULNAR (2PCS)

## (undated) DEVICE — COVER CAP 27" DEPTH

## (undated) DEVICE — GOWN TRIMAX LG

## (undated) DEVICE — GLV 6.5 PROTEXIS

## (undated) DEVICE — IRR BULB PATHFINDER + 10"

## (undated) DEVICE — DRAPE C ARM UNIVERSAL

## (undated) DEVICE — SOL IRR BAG NS 0.9% 3000ML

## (undated) DEVICE — SOL IRR POUR H2O 1500ML

## (undated) DEVICE — SOL IRR BAG H2O 3000ML

## (undated) DEVICE — PACK CYSTO

## (undated) DEVICE — FOLEY TRAY 16FR 5CC LTX UMETER CLOSED